# Patient Record
Sex: FEMALE | Race: WHITE | NOT HISPANIC OR LATINO | URBAN - METROPOLITAN AREA
[De-identification: names, ages, dates, MRNs, and addresses within clinical notes are randomized per-mention and may not be internally consistent; named-entity substitution may affect disease eponyms.]

---

## 2017-08-08 ENCOUNTER — INPATIENT (INPATIENT)
Facility: HOSPITAL | Age: 77
LOS: 0 days | Discharge: HOME | End: 2017-08-08

## 2017-08-08 DIAGNOSIS — R07.9 CHEST PAIN, UNSPECIFIED: ICD-10-CM

## 2017-08-10 DIAGNOSIS — Z98.42 CATARACT EXTRACTION STATUS, LEFT EYE: ICD-10-CM

## 2017-08-10 DIAGNOSIS — G47.33 OBSTRUCTIVE SLEEP APNEA (ADULT) (PEDIATRIC): ICD-10-CM

## 2017-08-10 DIAGNOSIS — I49.3 VENTRICULAR PREMATURE DEPOLARIZATION: ICD-10-CM

## 2017-08-10 DIAGNOSIS — I10 ESSENTIAL (PRIMARY) HYPERTENSION: ICD-10-CM

## 2017-08-10 DIAGNOSIS — I25.10 ATHEROSCLEROTIC HEART DISEASE OF NATIVE CORONARY ARTERY WITHOUT ANGINA PECTORIS: ICD-10-CM

## 2017-08-10 DIAGNOSIS — R07.9 CHEST PAIN, UNSPECIFIED: ICD-10-CM

## 2017-08-10 DIAGNOSIS — Z98.41 CATARACT EXTRACTION STATUS, RIGHT EYE: ICD-10-CM

## 2017-12-18 ENCOUNTER — OUTPATIENT (OUTPATIENT)
Dept: OUTPATIENT SERVICES | Facility: HOSPITAL | Age: 77
LOS: 1 days | Discharge: HOME | End: 2017-12-18

## 2017-12-18 DIAGNOSIS — Z12.31 ENCOUNTER FOR SCREENING MAMMOGRAM FOR MALIGNANT NEOPLASM OF BREAST: ICD-10-CM

## 2017-12-18 DIAGNOSIS — R07.9 CHEST PAIN, UNSPECIFIED: ICD-10-CM

## 2018-12-19 ENCOUNTER — OUTPATIENT (OUTPATIENT)
Dept: OUTPATIENT SERVICES | Facility: HOSPITAL | Age: 78
LOS: 1 days | Discharge: HOME | End: 2018-12-19

## 2018-12-19 DIAGNOSIS — Z12.31 ENCOUNTER FOR SCREENING MAMMOGRAM FOR MALIGNANT NEOPLASM OF BREAST: ICD-10-CM

## 2018-12-21 ENCOUNTER — OUTPATIENT (OUTPATIENT)
Dept: OUTPATIENT SERVICES | Facility: HOSPITAL | Age: 78
LOS: 1 days | Discharge: HOME | End: 2018-12-21

## 2018-12-21 DIAGNOSIS — R92.8 OTHER ABNORMAL AND INCONCLUSIVE FINDINGS ON DIAGNOSTIC IMAGING OF BREAST: ICD-10-CM

## 2019-01-02 PROBLEM — Z00.00 ENCOUNTER FOR PREVENTIVE HEALTH EXAMINATION: Status: ACTIVE | Noted: 2019-01-02

## 2019-01-14 ENCOUNTER — APPOINTMENT (OUTPATIENT)
Dept: BREAST CENTER | Facility: CLINIC | Age: 79
End: 2019-01-14
Payer: MEDICARE

## 2019-01-14 VITALS
OXYGEN SATURATION: 98 % | HEIGHT: 62 IN | WEIGHT: 170 LBS | BODY MASS INDEX: 31.28 KG/M2 | DIASTOLIC BLOOD PRESSURE: 80 MMHG | SYSTOLIC BLOOD PRESSURE: 124 MMHG | HEART RATE: 75 BPM

## 2019-01-14 DIAGNOSIS — R92.2 INCONCLUSIVE MAMMOGRAM: ICD-10-CM

## 2019-01-14 PROCEDURE — 99203 OFFICE O/P NEW LOW 30 MIN: CPT

## 2019-01-23 NOTE — HISTORY OF PRESENT ILLNESS
[FreeTextEntry1] : Sophia is a 78 F who presents with a screen detected right breast cyst.\par \par She has no breast related complaints.  She denies any breast pain, has not palpated any suspicious masses and denies any nipple discharge or retraction. \par \par Her work up was as follows: \par 20 -- b/l screening mammogram \par -scattered areas of fibroglandular density \par -apparent asymmetry in mid 1/3rd, lateral L breast \par -no suspicious mass, calcifications or other abnormalities in R \par BIRADS 0 \par \par 20 -- L dx mammogram and US \par -heterogenously dense breast s\par -L asymmetry consistent with asymmetry in superior breast, persists on spot compression \par US \par @1:00, 7-8 cm FN, 0.4 x 0.5  x 0.3 cm simple cyst \par BIRADS 2\par \par HISTORICAL RISK FACTORS: \par -no prior breast biopsies or surgeries \par -no family history of breast or ovarian cancer \par -, age at first live birth was 21 \par -prior OCP use x 4 months in the past, stopped at age 32

## 2019-01-23 NOTE — REVIEW OF SYSTEMS
[As Noted in HPI] : as noted in HPI [Negative] : Heme/Lymph [Abn Vaginal Bleeding] : no unexplained vaginal bleeding [Skin Lesions] : no skin lesions [Skin Wound] : no skin wound [Breast Pain] : no breast pain [Breast Lump] : no breast lump [Hot Flashes] : no hot flashes

## 2019-01-23 NOTE — CONSULT LETTER
[Dear  ___] : Dear  [unfilled], [Consult Letter:] : I had the pleasure of evaluating your patient, [unfilled]. [Please see my note below.] : Please see my note below. [Consult Closing:] : Thank you very much for allowing me to participate in the care of this patient.  If you have any questions, please do not hesitate to contact me. [Sincerely,] : Sincerely, [FreeTextEntry2] : Israel Barnard MD\par [address]\[place]  [address]\par  [FreeTextEntry3] : Radha Carreon MD \par Breast Surgical Oncologist\par [facility]\par [facility]\par [facility]

## 2019-01-23 NOTE — DATA REVIEWED
[FreeTextEntry1] : EXAM: MG MAMMO SCREEN W CHANTELLE BI# \par \par \par PROCEDURE DATE: 12/19/2018 \par \par \par \par INTERPRETATION: HISTORY: \par Bilateral MG MAMMO SCREEN W CHANTELLE BI# was performed. Patient is 78 years old \par and is seen for screening. The patient has no personal history of cancer. \par The patient has no family history of breast cancer. \par \par RISK ASSESSMENT: \par NCI Lifetime Risk: 2.6 \par Tyrer-Cuzick Lifetime Risk: 3.5 \par \par CLINICAL BREAST EXAM: \par The patient reports her last clinical breast exam was performed within the \par past month. \par \par COMPARISON STUDIES: \par The present examination has been compared to prior imaging studies performed \par at [facility] on 12/21/2015, 12/16/2016 and \par 12/18/2017. \par \par MAMMOGRAM FINDINGS: \par Mammography was performed including the following views: bilateral \par craniocaudal with tomosynthesis and bilateral mediolateral oblique with \par tomosynthesis. The examination includes digital synthetic 2D and digital \par tomosynthesis 3D images. Additional imaging analysis was performed using CAD \par (computer-aided detection) software. \par \par There are scattered areas of fibroglandular density. \par \par There is an apparent asymmetry seen in the CC view only seen in the middle \par third, lateral region of the left breast. \par \par No suspicious mass, grouping of calcifications, or other abnormality is \par identified in the right breast. \par \par IMPRESSION: \par Apparent asymmetry in the left breast requires additional evaluation. \par \par RECOMMENDATION: \par Patient will be recalled for additional mammographic views and, if \par indicated, breast ultrasound. \par \par ASSESSMENT: \par BI-RADS Category 0: Incomplete: Needs Additional Imaging Evaluation \par \par The patient will be notified of these results by telephone, and will also be \par mailed a written summary in layman's terms. \par \par \par \par \par \par \par \par \par AISHA KERN M.D., ATTENDING RADIOLOGIST \par This document has been electronically signed. Dec 19 2018 10:12AM \par \par \par \par Patient History (30)Patient Reports \par \par \par \par EXAM: US BREAST LIMITED LT \par EXAM: MG MAMMO DIAG W CHANTELLE LT# \par \par \par PROCEDURE DATE: 12/21/2018 \par \par \par \par INTERPRETATION: Clinical History / Reason for exam: 78-year-old female \par presents for additional evaluation of apparent asymmetry in the lateral \par region of the left breast on screening mammogram 12/19/2018. \par \par The patient reports her last clinical breast examination was performed \par within the past month. \par \par Family history: No family history of breast cancer. \par \par Comparisons: Mammogram 12/19/2018 and dating back to12/5/2012. Breast \par ultrasound dated 12/21/2015. \par \par Views obtained: Left ML, left spot compression CC and MLO views. \par \par Computer-aided detection was utilized in the interpretation of this \par examination. Digital breast tomosynthesis was performed and used in the \par interpretation of images. \par \par Breast composition:The breasts are heterogeneously dense, which may obscure \par small masses. \par \par FINDINGS: \par \par Mammogram: \par \par Upon further review, previously queried CC view asymmetry within the left \par breast, middle third depth, corresponds to an asymmetry within superior \par aspect of the left breast. This focal asymmetry persists on spot compression \par MLO view, changes configuration on spot compression CC view and corresponds \par to sonographically seen benign cyst at 1:00 position on today's targeted \par ultrasound. \par \par Ultrasound: \par \par Targeted unilateral left breast ultrasound of the upper outer quadrant was \par performed. \par \par At 1:00, 7 to 8 cm from the nipple, there is a 0.4 x 0.5 x 0.3 cm simple \par benign cyst. \par \par IMPRESSION: No mammographic or sonographic evidence of malignancy. \par \par Recommendation: Unless otherwise indicated by clinical findings, annual \par screening mammography recommended. \par \par BI-RADS Category 2: Benign \par \par The above findings and recommendations were discussed with the patient at \par the time of the examination. \par \par \par \par \par \par \par VIOLET DAVIS \par This document has been electronically signed. Dec 21 2018 7:15PM \par

## 2019-01-23 NOTE — ASSESSMENT
[FreeTextEntry1] : Sophia is a 78 F with a benign left breast cyst. \par \par There was no evidence of disease on physical exam.  SHe has no breast related complaints at this time.  Her most recent imaging was a b/l screening mammogram, L dx mammogram and US which revealed a left breast simple cyst @1:00, 7-8 cm FN, measuring 0.4 x 0.5 x 0.3 cm.\par \par We discussed breast cysts. They are not pre-malignant nor do they have malignant potential and are hormonally influenced.  They may grow or shrink in size as well as resolve spontaneously and there is usually no intervention unless they are symptomatic.  In several large studies, patients with breast cysts and a positive family history had a higher relative risk of breast cancer (from 1.5 to 3).  She is asymptomatic from her left breast cyst so no intervention will be performed at this time.\par \par We discussed dense breasts.  Increasing breast density has been found to increase ones risk of breast cancer, but at this time, there is no clear indication for additional imaging in this setting, as both US and MRI have not been found to improve survival.  One can consider bilateral screening US.  However, out of 1000 women screened, the use of routine US will only identify an additional 3-5 cancers.  The use of US was found to increase the likelihood of undergoing more imaging and more biopsies.  She does have dense breasts.  We have decided to proceed with screening bilateral breast US at this time.  This will be scheduled with her next screening mammogram.\par \par Her next screening mammogram will be due on 12/19/19. \par \par All of her questions were answered.  She knows to call with any further questions or concerns. \par \par PLAN\par -f/up in 1 year after bilateral screening mammogram and US \par

## 2019-01-23 NOTE — PHYSICAL EXAM
[Normocephalic] : normocephalic [Atraumatic] : atraumatic [EOMI] : extra ocular movement intact [No Supraclavicular Adenopathy] : no supraclavicular adenopathy [No Cervical Adenopathy] : no cervical adenopathy [Examined in the supine and seated position] : examined in the supine and seated position [No dominant masses] : no dominant masses in right breast  [No dominant masses] : no dominant masses left breast [No Nipple Retraction] : no left nipple retraction [No Nipple Discharge] : no left nipple discharge [No Axillary Lymphadenopathy] : no left axillary lymphadenopathy [Soft] : abdomen soft [Not Tender] : non-tender [No Edema] : no edema [No Rashes] : no rashes [No Ulceration] : no ulceration [de-identified] : no suspicious lesions were palpated in either breast

## 2019-01-23 NOTE — PAST MEDICAL HISTORY
[Menarche Age ____] : age at menarche was [unfilled] [Menopause Age____] : age at menopause was [unfilled] [Total Preg ___] : G[unfilled] [Live Births ___] : P[unfilled]  [Age At Live Birth ___] : Age at live birth: [unfilled] [History of Hormone Replacement Treatment] : has no history of hormone replacement treatment [FreeTextEntry5] : denies [FreeTextEntry6] : denies [FreeTextEntry7] : yes in past x 4 months  [FreeTextEntry8] : denies

## 2019-11-25 ENCOUNTER — APPOINTMENT (OUTPATIENT)
Dept: OBGYN | Facility: CLINIC | Age: 79
End: 2019-11-25
Payer: MEDICARE

## 2019-11-25 VITALS — BODY MASS INDEX: 30.73 KG/M2 | SYSTOLIC BLOOD PRESSURE: 126 MMHG | DIASTOLIC BLOOD PRESSURE: 80 MMHG | WEIGHT: 168 LBS

## 2019-11-25 DIAGNOSIS — N95.2 POSTMENOPAUSAL ATROPHIC VAGINITIS: ICD-10-CM

## 2019-11-25 DIAGNOSIS — N60.02 SOLITARY CYST OF LEFT BREAST: ICD-10-CM

## 2019-11-25 DIAGNOSIS — N88.2 STRICTURE AND STENOSIS OF CERVIX UTERI: ICD-10-CM

## 2019-11-25 PROCEDURE — 99214 OFFICE O/P EST MOD 30 MIN: CPT

## 2019-11-25 NOTE — PHYSICAL EXAM
[Alert] : alert [Acute Distress] : no acute distress [Awake] : awake [Mass] : no breast mass [Axillary LAD] : no axillary lymphadenopathy [Nipple Discharge] : no nipple discharge [Tender] : non tender [Soft] : soft [Oriented x3] : oriented to person, place, and time [Normal] : uterus [Atrophy] : atrophy [Cystocele] : a cystocele [No Bleeding] : there was no active vaginal bleeding [FreeTextEntry5] : Cervix flush to posterior vagina with mild to moderate stenosis [Uterine Adnexae] : were not tender and not enlarged

## 2019-12-19 ENCOUNTER — FORM ENCOUNTER (OUTPATIENT)
Age: 79
End: 2019-12-19

## 2019-12-20 ENCOUNTER — OUTPATIENT (OUTPATIENT)
Dept: OUTPATIENT SERVICES | Facility: HOSPITAL | Age: 79
LOS: 1 days | Discharge: HOME | End: 2019-12-20
Payer: MEDICARE

## 2019-12-20 DIAGNOSIS — Z12.31 ENCOUNTER FOR SCREENING MAMMOGRAM FOR MALIGNANT NEOPLASM OF BREAST: ICD-10-CM

## 2019-12-20 PROCEDURE — 77063 BREAST TOMOSYNTHESIS BI: CPT | Mod: 26

## 2019-12-20 PROCEDURE — 77067 SCR MAMMO BI INCL CAD: CPT | Mod: 26

## 2020-12-03 ENCOUNTER — APPOINTMENT (OUTPATIENT)
Dept: OBGYN | Facility: CLINIC | Age: 80
End: 2020-12-03
Payer: MEDICARE

## 2020-12-03 DIAGNOSIS — N95.2 POSTMENOPAUSAL ATROPHIC VAGINITIS: ICD-10-CM

## 2020-12-03 DIAGNOSIS — N88.2 STRICTURE AND STENOSIS OF CERVIX UTERI: ICD-10-CM

## 2020-12-03 DIAGNOSIS — R35.1 NOCTURIA: ICD-10-CM

## 2020-12-03 DIAGNOSIS — N81.4 UTEROVAGINAL PROLAPSE, UNSPECIFIED: ICD-10-CM

## 2020-12-03 PROCEDURE — 99072 ADDL SUPL MATRL&STAF TM PHE: CPT

## 2020-12-03 PROCEDURE — 99214 OFFICE O/P EST MOD 30 MIN: CPT

## 2020-12-03 NOTE — DISCUSSION/SUMMARY
[FreeTextEntry1] : Pap done\par Self breast exam stressed\par Prescribed bilateral screening mammogram\par Patient advised to restrict heavy lifting\par Advise consultation with urologist or urogynecologist regarding urinary symptoms\par Recommend OTC Replens vaginal moisturizer two times a week as an alterative to hormonal treatment.\par Follow-up yearly or as needed

## 2020-12-03 NOTE — PHYSICAL EXAM
[Appropriately responsive] : appropriately responsive [Alert] : alert [No Acute Distress] : no acute distress [No Lymphadenopathy] : no lymphadenopathy [Regular Rate Rhythm] : regular rate rhythm [No Murmurs] : no murmurs [Clear to Auscultation B/L] : clear to auscultation bilaterally [Soft] : soft [Non-tender] : non-tender [Non-distended] : non-distended [No HSM] : No HSM [No Lesions] : no lesions [No Mass] : no mass [Oriented x3] : oriented x3 [Examination Of The Breasts] : a normal appearance [No Masses] : no breast masses were palpable [Labia Majora] : normal [Labia Minora] : normal [Atrophy] : atrophy [Cystocele] : a cystocele [Cervical Stenosis] : cervical stenosis [Normal] : normal [Uterine Adnexae] : normal

## 2020-12-03 NOTE — HISTORY OF PRESENT ILLNESS
[FreeTextEntry1] : Patient is 80 years old para 3-0-1-3 last menstrual period age 52\par She denies postmenopausal bleeding.  The patient notes vaginal dryness occasional urinary symptoms including nocturia.

## 2020-12-30 ENCOUNTER — RESULT REVIEW (OUTPATIENT)
Age: 80
End: 2020-12-30

## 2020-12-30 ENCOUNTER — OUTPATIENT (OUTPATIENT)
Dept: OUTPATIENT SERVICES | Facility: HOSPITAL | Age: 80
LOS: 1 days | Discharge: HOME | End: 2020-12-30
Payer: MEDICARE

## 2020-12-30 DIAGNOSIS — Z12.31 ENCOUNTER FOR SCREENING MAMMOGRAM FOR MALIGNANT NEOPLASM OF BREAST: ICD-10-CM

## 2020-12-30 PROCEDURE — 77063 BREAST TOMOSYNTHESIS BI: CPT | Mod: 26

## 2020-12-30 PROCEDURE — 77067 SCR MAMMO BI INCL CAD: CPT | Mod: 26
